# Patient Record
(demographics unavailable — no encounter records)

---

## 2025-02-03 NOTE — HISTORY OF PRESENT ILLNESS
[FreeTextEntry1] : Andi is a 36 yo with history of auras and seizures.   Reporting still an Aura, momentary  <1s about 4-5/year, not even as severe as a alysa vu.  Has not had a progression, except once with headphones on due to a recording and it was loud, and it took 2-3 seconds and was approaching but did not progress due to ability to get headphones off immediately.  Working [employer] still. Takes more medication at times when needing to drive. More frequently now taking just 1 tab of oxtellar 600mg, because the alternating dosing he felt was worse. Used to take 2 x 600 = 1200mg before long drives/track days etc.    No nausea, headache and not really any ataxia/dizziness.  Once looked down fast at the table and felt a bit disoriented for a moment, but dissipated quickly. Intermittent numbness in the hands and feet - can last days, then resolved for 2 weeks.  Seems associated with stress and anxiety.